# Patient Record
Sex: FEMALE | Race: WHITE | Employment: OTHER | URBAN - METROPOLITAN AREA
[De-identification: names, ages, dates, MRNs, and addresses within clinical notes are randomized per-mention and may not be internally consistent; named-entity substitution may affect disease eponyms.]

---

## 2017-10-30 ENCOUNTER — CLINICAL SUPPORT (OUTPATIENT)
Dept: PULMONOLOGY | Age: 62
End: 2017-10-30

## 2017-10-30 VITALS — WEIGHT: 105 LBS | BODY MASS INDEX: 20.62 KG/M2 | HEIGHT: 60 IN

## 2017-10-30 DIAGNOSIS — J44.9 CHRONIC OBSTRUCTIVE PULMONARY DISEASE, UNSPECIFIED COPD TYPE (HCC): ICD-10-CM

## 2017-10-30 DIAGNOSIS — J44.9 CHRONIC OBSTRUCTIVE PULMONARY DISEASE, UNSPECIFIED COPD TYPE (HCC): Primary | ICD-10-CM

## 2017-10-30 RX ORDER — DANAZOL 200 MG/1
200 CAPSULE ORAL 2 TIMES DAILY
COMMUNITY

## 2017-10-30 RX ORDER — BUDESONIDE AND FORMOTEROL FUMARATE DIHYDRATE 160; 4.5 UG/1; UG/1
2 AEROSOL RESPIRATORY (INHALATION) 2 TIMES DAILY
COMMUNITY

## 2017-10-30 RX ORDER — LANOLIN ALCOHOL/MO/W.PET/CERES
CREAM (GRAM) TOPICAL
COMMUNITY

## 2017-10-30 RX ORDER — PANTOPRAZOLE SODIUM 40 MG/1
40 TABLET, DELAYED RELEASE ORAL DAILY
COMMUNITY

## 2017-10-30 RX ORDER — ASCORBIC ACID 500 MG
TABLET ORAL
COMMUNITY

## 2017-10-30 RX ORDER — ACETAMINOPHEN 500 MG
TABLET ORAL
COMMUNITY

## 2017-10-30 NOTE — PROGRESS NOTES
Spirometry read - It shows very severe obstruction with no BD response.     Rubi Coelho MD/MPH  Pulmonary, Critical Care Medicine  [facility]  10/30/17  2:00 PM

## 2017-11-07 ENCOUNTER — OFFICE VISIT (OUTPATIENT)
Dept: PULMONOLOGY | Age: 62
End: 2017-11-07

## 2017-11-07 VITALS
BODY MASS INDEX: 20.62 KG/M2 | SYSTOLIC BLOOD PRESSURE: 100 MMHG | WEIGHT: 105 LBS | HEIGHT: 60 IN | RESPIRATION RATE: 16 BRPM | TEMPERATURE: 97.6 F | HEART RATE: 100 BPM | OXYGEN SATURATION: 95 % | DIASTOLIC BLOOD PRESSURE: 80 MMHG

## 2017-11-07 DIAGNOSIS — B37.0 THRUSH: ICD-10-CM

## 2017-11-07 DIAGNOSIS — I50.22 CHRONIC SYSTOLIC CONGESTIVE HEART FAILURE, NYHA CLASS 4 (HCC): ICD-10-CM

## 2017-11-07 DIAGNOSIS — R06.09 DYSPNEA ON EXERTION: ICD-10-CM

## 2017-11-07 DIAGNOSIS — J44.9 STAGE 4 VERY SEVERE COPD BY GOLD CLASSIFICATION (HCC): Primary | ICD-10-CM

## 2017-11-07 RX ORDER — ALBUTEROL SULFATE 90 UG/1
AEROSOL, METERED RESPIRATORY (INHALATION)
COMMUNITY

## 2017-11-07 RX ORDER — ALBUTEROL SULFATE 0.83 MG/ML
SOLUTION RESPIRATORY (INHALATION) ONCE
COMMUNITY

## 2017-11-07 RX ORDER — NYSTATIN 100000 [USP'U]/ML
1 SUSPENSION ORAL 4 TIMES DAILY
Qty: 60 ML | Refills: 1 | Status: SHIPPED | OUTPATIENT
Start: 2017-11-07

## 2017-11-07 NOTE — LETTER
11/7/2017 12:08 PM 
 
Patient:  Michele Alfred YOB: 1955 Date of Visit: 11/7/2017 Dear Sydnee Mancilla., MD 
[place] [address] VIA Facsimile: [phone] 
 : 
 
 
Thank you for referring Ms. Balbir Villagomez to me for evaluation/treatment. Below are the relevant portions of my assessment and plan of care. If you have questions, please do not hesitate to call me. I look forward to following Ms. Elliot Jaquez along with you. Sincerely, Stephanie Diaz MD

## 2017-11-07 NOTE — MR AVS SNAPSHOT
Visit Information Date & Time Provider Department Dept. Phone Encounter #  
 11/7/2017 10:30 AM Aleksandra Nelson MD [facility] [id number] Follow-up Instructions Return in about 3 months (around 2/7/2018). Upcoming Health Maintenance Date Due Hepatitis C Screening 1955 Pneumococcal 19-64 Medium Risk (1 of 1 - PPSV23) 3/13/1975 DTaP/Tdap/Td series (1 - Tdap) 3/13/1977 PAP AKA CERVICAL CYTOLOGY 3/13/1977 BREAST CANCER SCRN MAMMOGRAM 3/13/2006 FOBT Q 1 YEAR AGE 50-75 3/13/2006 ZOSTER VACCINE AGE 60> 1/13/2016 Influenza Age 5 to Adult 8/1/2017 Allergies as of 11/7/2017  Review Complete On: 10/30/2017 By: Fredrick Ya, RT Severity Noted Reaction Type Reactions Chlorhexidine Medium 10/30/2017   Side Effect Rash Pcn [Penicillins]  11/07/2017    Other (comments) Was told as a child Sulfa (Sulfonamide Antibiotics)  11/07/2017    Other (comments) Was told as a child Current Immunizations  Never Reviewed No immunizations on file. Not reviewed this visit You Were Diagnosed With   
  
 Codes Comments Stage 4 very severe COPD by GOLD classification ([facility] [address]    -  Primary ICD-10-CM: J44.9 ICD-9-CM: 735 Chronic systolic congestive heart failure, NYHA class 4 (HCC)     ICD-10-CM: I50.22 ICD-9-CM: 428.22, 428.0 Vitals BP Pulse Temp Resp Height(growth percentile) Weight(growth percentile) 100/80 (BP 1 Location: Right arm, BP Patient Position: Sitting) 100 97.6 °F (36.4 °C) 16 5' (1.524 m) 105 lb (47.6 kg) SpO2 BMI Smoking Status 95% 20.51 kg/m2 Former Smoker Vitals History BMI and BSA Data Body Mass Index Body Surface Area 20.51 kg/m 2 1.42 m 2 Preferred Pharmacy Pharmacy Name Phone [facility] [phone] [facility], [address] [place] [address], [address] [place] [address] Drive Your Updated Medication List  
 This list is accurate as of: 11/7/17 11:32 AM.  Always use your most recent med list.  
  
  
  
  
 * albuterol 90 mcg/actuation inhaler Commonly known as:  PROVENTIL HFA, VENTOLIN HFA, PROAIR HFA Take  by inhalation. * albuterol 2.5 mg /3 mL (0.083 %) nebulizer solution Commonly known as:  PROVENTIL VENTOLIN  
by Nebulization route once. danazol 200 mg capsule Commonly known as:  DANOCRINE Take 200 mg by mouth two (2) times a day. DOCUSATE SODIUM PO Take  by mouth. inhalational spacing device 1 Each by Does Not Apply route as needed. Iron 325 mg (65 mg iron) tablet Generic drug:  ferrous sulfate Take  by mouth Daily (before breakfast). nystatin 100,000 unit/mL suspension Commonly known as:  MYCOSTATIN Take 5 mL by mouth four (4) times daily. swish and spit PROTONIX 40 mg tablet Generic drug:  pantoprazole Take 40 mg by mouth daily. SPIRIVA WITH HANDIHALER 18 mcg inhalation capsule Generic drug:  tiotropium Take 1 Cap by inhalation daily. SYMBICORT 160-4.5 mcg/actuation Hfaa Generic drug:  budesonide-formoterol Take 2 Puffs by inhalation two (2) times a day. TYLENOL EXTRA STRENGTH 500 mg tablet Generic drug:  acetaminophen Take  by mouth every six (6) hours as needed for Pain. VITAMIN C 500 mg tablet Generic drug:  ascorbic acid (vitamin C) Take  by mouth.  
  
 warfarin Tab Commonly known as:  COUMADIN Take  by mouth. * Notice: This list has 2 medication(s) that are the same as other medications prescribed for you. Read the directions carefully, and ask your doctor or other care provider to review them with you. Prescriptions Sent to Pharmacy Refills  
 nystatin (MYCOSTATIN) 100,000 unit/mL suspension 1 Sig: Take 5 mL by mouth four (4) times daily. swish and spit  Class: Normal  
 Pharmacy: [address] AT [address] [place] [address] #: [phone] Route: Oral  
 inhalational spacing device 1 Si Each by Does Not Apply route as needed. Class: Normal  
 Pharmacy: [facility] [address] [place] [address], [address] [place] [address] Ph #: [phone] Route: Does Not Apply Follow-up Instructions Return in about 3 months (around 2018). Patient Instructions Learning About Metered-Dose Inhalers for Children What is a metered-dose inhaler? A metered-dose inhaler lets your child breathe medicine directly into the lungs. Inhaled medicine works faster than the same medicine in a pill. An inhaler lets your child take less medicine than he or she would if it were taken as a pill. \"Metered-dose\" means that the inhaler gives a measured amount of medicine each time your child uses it. This type of inhaler delivers medicine in the form of a liquid mist. 
The doctor may want your child to use a spacer with the inhaler. A spacer is a chamber that attaches to the inhaler. The chamber holds the medicine before your child inhales it. That way, your child can inhale the medicine in as many breaths as he or she needs. Doctors recommend using a spacer with most metered-dose inhalers, especially those with corticosteroid medicines. A regular spacer has a mouthpiece. Some younger children have a hard time using it. They may need a face mask with a spacer instead. Your child can use the face mask instead of the mouthpiece. The mask fits over the child's mouth and nose. How does your child use a metered-dose inhaler? To get started · Ask the doctor, nurse, respiratory therapist, or pharmacist to watch your child use the inhaler the first time. It might help if your child practices using it in front of a mirror. Be sure your child uses the inhaler exactly as prescribed. · Check that your child has the correct medicine.  If your child uses several inhalers, put a label on each one so that he or she knows which one to use at the right time. · Keep track of how much medicine is in the inhaler. Check the label to see how many doses are in it. If you and your child know how many puffs to take, you can replace the inhaler before it runs out. Your doctor or pharmacist can teach you and your child how to keep track of how much medicine is left. · If your child is using corticosteroids, have your child gargle and rinse the mouth with water after use. Or have your child brush his or her teeth and spit after using the inhaler. Do not let your child swallow the water. Swallowing the water will increase the chance that the medicine will get into the bloodstream. This may make it more likely that your child will have side effects from the medicine. To use a spacer with an inhaler 1. Have your child shake the inhaler. Remove the inhaler cap, and place the mouthpiece of the inhaler into the spacer. Check the inhaler instructions to see if you need to prime the inhaler before you use it. If it needs priming, follow the instructions on how to prime it. 2. Remove the cap from the spacer. 3. The inhaler should be upright with the mouthpiece at the bottom. 4. Have your child tilt his or her head back a little and breathe out slowly and completely. 5. Place the spacer's mouthpiece in your child's mouth. 6. Have your child press down on the inhaler to spray one puff of medicine into the spacer. Then have your child take one deep, slow breath. Have your child hold his or her breath for 10 seconds. This will let the medicine settle in the lungs. Some spacers have a whistle. If you hear it, have your child breathe in more slowly. 7. If your child needs to take a second dose, wait 30 to 60 seconds. This lets the inhaler valve refill. To use an inhaler without a spacer 1. Have your child shake the inhaler as directed. Remove the cap.  Check the inhaler instructions to see if you need to prime your child's inhaler before you use it. If it needs priming, follow the instructions on how to prime it. 2. The inhaler should be upright with the mouthpiece at the bottom. 3. Have your child tilt his or her head back a little and breathe out slowly and completely. 4. The inhaler can be positioned in one of two ways: ¨ The inhaler mouthpiece can be in your child's mouth. This method is easier for most children. It also lowers the risk that any of the medicine will get into the eyes. ¨ Or the mouthpiece can be held 1 to 2 inches in front of your child's open mouth. With this method, the lips should not be closed over the mouthpiece. Instead, your child's mouth should be open as wide as possible. This method, with the mouthpiece in front of your child's open mouth, may be better for getting the medicine into the lungs. But some children may find it too hard to do. 5. Your child can start taking slow, even breaths through the mouth. Press down on the inhaler one time. Then have your child inhale fully. 6. Have your child hold his or her breath for 10 seconds. This will let the medicine settle in the lungs. If your child needs to take a second dose, wait 30 to 60 seconds to let the inhaler valve refill. Follow-up care is a key part of your child's treatment and safety. Be sure to make and go to all appointments, and call your doctor if your child is having problems. It's also a good idea to know your child's test results and keep a list of the medicines your child takes. Where can you learn more? Go to http://coral-philip.info/. Enter D341 in the search box to learn more about \"Learning About Metered-Dose Inhalers for Children. \" Current as of: May 12, 2017 Content Version: 11.4 © 8853-2495 Healthwise, Incorporated.  Care instructions adapted under license by [employer] (which disclaims liability or warranty for this information). If you have questions about a medical condition or this instruction, always ask your healthcare professional. [address] any warranty or liability for your use of this information. Introducing \[facility]\"" SERVICES! Samantha Hoover introduces [employer] patient portal. Now you can access parts of your medical record, email your doctor's office, and request medication refills online. 1. In your internet browser, go to [email or web address] 2. Click on the First Time User? Click Here link in the Sign In box. You will see the New Member Sign Up page. 3. Enter your [employer] Access Code exactly as it appears below. You will not need to use this code after youve completed the sign-up process. If you do not sign up before the expiration date, you must request a new code. · [employer] Access Code: [email or web address] Expires: 1/28/2018 10:02 AM 
 
4. Enter the last four digits of your Social Security Number (xxxx) and Date of Birth (mm/dd/yyyy) as indicated and click Submit. You will be taken to the next sign-up page. 5. Create a [employer] ID. This will be your [employer] login ID and cannot be changed, so think of one that is secure and easy to remember. 6. Create a [employer] password. You can change your password at any time. 7. Enter your Password Reset Question and Answer. This can be used at a later time if you forget your password. 8. Enter your e-mail address. You will receive e-mail notification when new information is available in [address]. 9. Click Sign Up. You can now view and download portions of your medical record. 10. Click the Download Summary menu link to download a portable copy of your medical information. If you have questions, please visit the Frequently Asked Questions section of the [employer] website. Remember, [employer] is NOT to be used for urgent needs. For medical emergencies, dial 911. Now available from your iPhone and Android! Please provide this summary of care documentation to your next provider. Your primary care clinician is listed as Starling Rummage. If you have any questions after today's visit, please call [phone].

## 2017-11-07 NOTE — PATIENT INSTRUCTIONS
Learning About Metered-Dose Inhalers for Children  What is a metered-dose inhaler? A metered-dose inhaler lets your child breathe medicine directly into the lungs. Inhaled medicine works faster than the same medicine in a pill. An inhaler lets your child take less medicine than he or she would if it were taken as a pill. \"Metered-dose\" means that the inhaler gives a measured amount of medicine each time your child uses it. This type of inhaler delivers medicine in the form of a liquid mist.  The doctor may want your child to use a spacer with the inhaler. A spacer is a chamber that attaches to the inhaler. The chamber holds the medicine before your child inhales it. That way, your child can inhale the medicine in as many breaths as he or she needs. Doctors recommend using a spacer with most metered-dose inhalers, especially those with corticosteroid medicines. A regular spacer has a mouthpiece. Some younger children have a hard time using it. They may need a face mask with a spacer instead. Your child can use the face mask instead of the mouthpiece. The mask fits over the child's mouth and nose. How does your child use a metered-dose inhaler? To get started  · Ask the doctor, nurse, respiratory therapist, or pharmacist to watch your child use the inhaler the first time. It might help if your child practices using it in front of a mirror. Be sure your child uses the inhaler exactly as prescribed. · Check that your child has the correct medicine. If your child uses several inhalers, put a label on each one so that he or she knows which one to use at the right time. · Keep track of how much medicine is in the inhaler. Check the label to see how many doses are in it. If you and your child know how many puffs to take, you can replace the inhaler before it runs out. Your doctor or pharmacist can teach you and your child how to keep track of how much medicine is left.   · If your child is using corticosteroids, have your child gargle and rinse the mouth with water after use. Or have your child brush his or her teeth and spit after using the inhaler. Do not let your child swallow the water. Swallowing the water will increase the chance that the medicine will get into the bloodstream. This may make it more likely that your child will have side effects from the medicine. To use a spacer with an inhaler  1. Have your child shake the inhaler. Remove the inhaler cap, and place the mouthpiece of the inhaler into the spacer. Check the inhaler instructions to see if you need to prime the inhaler before you use it. If it needs priming, follow the instructions on how to prime it. 2. Remove the cap from the spacer. 3. The inhaler should be upright with the mouthpiece at the bottom. 4. Have your child tilt his or her head back a little and breathe out slowly and completely. 5. Place the spacer's mouthpiece in your child's mouth. 6. Have your child press down on the inhaler to spray one puff of medicine into the spacer. Then have your child take one deep, slow breath. Have your child hold his or her breath for 10 seconds. This will let the medicine settle in the lungs. Some spacers have a whistle. If you hear it, have your child breathe in more slowly. 7. If your child needs to take a second dose, wait 30 to 60 seconds. This lets the inhaler valve refill. To use an inhaler without a spacer  1. Have your child shake the inhaler as directed. Remove the cap. Check the inhaler instructions to see if you need to prime your child's inhaler before you use it. If it needs priming, follow the instructions on how to prime it. 2. The inhaler should be upright with the mouthpiece at the bottom. 3. Have your child tilt his or her head back a little and breathe out slowly and completely. 4. The inhaler can be positioned in one of two ways:  ¨ The inhaler mouthpiece can be in your child's mouth.  This method is easier for most children. It also lowers the risk that any of the medicine will get into the eyes. ¨ Or the mouthpiece can be held 1 to 2 inches in front of your child's open mouth. With this method, the lips should not be closed over the mouthpiece. Instead, your child's mouth should be open as wide as possible. This method, with the mouthpiece in front of your child's open mouth, may be better for getting the medicine into the lungs. But some children may find it too hard to do. 5. Your child can start taking slow, even breaths through the mouth. Press down on the inhaler one time. Then have your child inhale fully. 6. Have your child hold his or her breath for 10 seconds. This will let the medicine settle in the lungs. If your child needs to take a second dose, wait 30 to 60 seconds to let the inhaler valve refill. Follow-up care is a key part of your child's treatment and safety. Be sure to make and go to all appointments, and call your doctor if your child is having problems. It's also a good idea to know your child's test results and keep a list of the medicines your child takes. Where can you learn more? Go to http://coral-philip.info/. Enter D341 in the search box to learn more about \"Learning About Metered-Dose Inhalers for Children. \"  Current as of: May 12, 2017  Content Version: 11.4  © 9293-6078 Healthwise, Incorporated. Care instructions adapted under license by [employer] (which disclaims liability or warranty for this information). If you have questions about a medical condition or this instruction, always ask your healthcare professional. [place] [address] any warranty or liability for your use of this information.

## 2017-11-07 NOTE — PROGRESS NOTES
[facility]  Pulmonary, Critical Care, and Sleep Medicine      Pulmonary Office Initial Consultation    Name: Obi Charles     : 1955     Date: 2017        Referring provider: Isrrael Ortiz NP  CC: COPD management    Subjective:     Patient is a 65 y.o. female with hx of COPD, CHF s/p LVAD placement, a-fib, on chronic anticoagulation with warfarin, presents to Pulmonary clinic referred for management of her COPD. Pt reports being diagnosed with COPD in . Does not get frequent bronchitis. Was on PRN albuterol inhaler and nebulizer during this time. Pt on Spiriva and Symbicort (without spacer) since . She reports getting thrush with oral blisters a few times since starting Symbicort, most recent was one week ago. She uses her prn albuterol very infrequently, maybe a few times a week. Pt quit smoking in 2017, prior 2PPD for 45 years. 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 Pt was trach and PEG'ed in 17 for respiratory failure, now decannulated on 10/17/17, pt was on mech vent for about 2-3 weeks per daughter present. Pt reports she is doing well. Currently still dyspneic with minimal exertion, she reports she may get SOB when rolling in bed. She notes her strength has improved in the last two weeks.     Associated symptoms:  + wt loss 30lbs in 6 months but now gaining wt  Mild chronic cough, only rarely productive of whitish sputum  Denies chest pain, fevers, chills, N/V/D, hemoptysis, night sweats, wheezing, frequent use of PRN albuterol inhaler    Pets: none  No illicit drug use      Past Medical History:   Diagnosis Date    Chronic lung disease     Congestive heart failure ([facility] [address]     Coronary artery disease     3 open heart surg.  Pneumonia        PSHx:  Reviewed, pt has had LVAD in Sept 2017 - underwent three procedures that month      Social History     Social History    Marital status:      Spouse name: N/A    Number of children: N/A    Years of education: N/A     Social History Main Topics    Smoking status: Former Smoker     Packs/day: 1.00     Years: 43.00     Types: Cigarettes     Quit date: 7/30/2017    Smokeless tobacco: Never Used    Alcohol use Not on file    Drug use: Not on file    Sexual activity: Not on file     Other Topics Concern    Not on file     Social History Narrative   As noted above with occ hx    Family History   Problem Relation Age of Onset    Diabetes Mother     Hypertension Mother     Heart Disease Mother     Heart Disease Father        Allergies   Allergen Reactions    Chlorhexidine Rash    Pcn [Penicillins] Other (comments)     Was told as a child    Sulfa (Sulfonamide Antibiotics) Other (comments)     Was told as a child       . Current Outpatient Prescriptions   Medication Sig Dispense Refill    albuterol (PROVENTIL HFA, VENTOLIN HFA, PROAIR HFA) 90 mcg/actuation inhaler Take  by inhalation.  albuterol (PROVENTIL VENTOLIN) 2.5 mg /3 mL (0.083 %) nebulizer solution by Nebulization route once.  ferrous sulfate (IRON) 325 mg (65 mg iron) tablet Take  by mouth Daily (before breakfast).  ascorbic acid, vitamin C, (VITAMIN C) 500 mg tablet Take  by mouth.  acetaminophen (TYLENOL EXTRA STRENGTH) 500 mg tablet Take  by mouth every six (6) hours as needed for Pain.  DOCUSATE SODIUM PO Take  by mouth.  danazol (DANOCRINE) 200 mg capsule Take 200 mg by mouth two (2) times a day.  warfarin (COUMADIN) tab Take  by mouth.  tiotropium (SPIRIVA WITH HANDIHALER) 18 mcg inhalation capsule Take 1 Cap by inhalation daily.       budesonide-formoterol (SYMBICORT) 160-4.5 mcg/actuation HFAA Take 2 Puffs by inhalation two (2) times a day.  pantoprazole (PROTONIX) 40 mg tablet Take 40 mg by mouth daily. Review of Systems:  A complete review of systems was performed as stated above in HPI, all others are negative    Objective:     Visit Vitals    /80 (BP 1 Location: Right arm, BP Patient Position: Sitting)  Comment: very hard to hear due to pump    Pulse 100    Temp 97.6 °F (36.4 °C)    Resp 16    Ht 5' (1.524 m)    Wt 47.6 kg (105 lb)  Comment: yesterday home health    SpO2 95%    BMI 20.51 kg/m2        Physical Exam:   Vitals reviewed  GENERAL: Pt sitting in wheelchair without acute distress, pleasant, cooperative, wearing LVAD controller around neck  HEENT:  NC/AT, PERRL, EOMI, no alar flaring or epistaxis, oral mucosa moist without cyanosis  NECK:  no jugular vein distention, no retractions, no thyromegaly or masses, tracheostomy site healed over, no lesions  LUNGS:  Distant breath sounds B/L, no wheezes/rales/rhonchi  HEART:  Mechanical pump sounds heard throughout precordium, regular rate/rhythm with mechanical oscillations, unable to hear M/R/G or S1/S2 sounds  ABDOMEN:  soft with no tenderness, bowel sounds present  EXTREMITIES:  warm with no cyanosis or edema  SKIN:  no jaundice or ecchymosis  NEUROLOGIC:  alert and oriented, grossly non-focal, normal strength and coordination while sitting in chair  PSYCH: normal memory, thought content and memory    Data review:     Labs:  No new labs present, none provided in referral.      PFT:  I have reviewed these results from 10/30/17 -- shows very severe obstruction  Ratio 41  FEV1 25%/29%  FVC 48%/55%    Imaging: No new images. Records from [facility] requested          Assessment and Plan  66 y/o female with    Impression:  1. Dyspnea with Gold Stage IV COPD, risk category B, RAZIA index 10 based on mMRC of 4 (this may be largely driven by heart failure):  Pt currently stable with no evidence of exacerbation  2. Thrush:  Etiology due to ICS and poor inhaler technique  3. CHF s/p LVAD placement, NHYA IV - document as nonischemic cardiomyopathy:  Pt managed at [facility]  4. Hx of tobacco use:  Pt was prior 2PPD smoker, quit in July 2017. Pt currently using e-cigarette  5. Chronic respiratory failure:  Pt was on mech vent s/p trach in 9/25 which was decannulated 10/17/17  6. Hx of GI bleed due to multiple AVMs  7. A-fib    Plan:  -Asked to obtain records from [address] [place] [address] including chest imaging (XR and CT scans). Counseled patient regarding annual lung cancer screening with low dose CT, including risks and benefits. We will see if patient had dedicated CT scan while at [address] [place] [address] and readdress at followup.  -Pt unable to perform 6MWT to see if she needs supplemental oxygen with ambulation. Since patient is recovering from recent multiple surgeries, we will hold off on nocturnal pulse oximetry but will consider this at a future visit.  -Continue Spiriva qd, Symbicort (160/4.5mcg 2puffs BID), and albuterol PRN. Prescribed spacer chamber for Symbicort and had staff show patient proper inhaler technique. Advised patient that if she has any questions to come back to our office with equipment so we can demonstrate it. Advised to rinse mouth thoroughly and wash spacer and dry it completely after each use. -Nystatin oral x 1 week for thrush  -Management of CHF per [facility]  -Advised patient to followup with [facility] pulmonary regarding possible transplant as patient may likely need heart and lung transplant because heart transplant alone would be high risk. Advised patient to discuss with [facility] pulmonary and cardiology. I instructed her that we would be glad to perform any testing locally needed to help facilitate her workup.  -Advised patient to followup with cardiopulmonary rehab as prescribed by [address] [place] [address] Cardiology, which may help with her symptoms of dyspnea.   -Counseled patient regarding remaining abstinent from tobacco use including adverse health risks and worsening of COPD. Advised patient that she may contact our office for assistance if she desires. I also counseled her against use of e-cigarettes and advised her on cessation of this as well including potential health risks. -Pneumovax: 2015  -Flu shot: pt declined.   Risks and benefits were explained      RTC 3 months    Orders Placed This Encounter    albuterol (PROVENTIL HFA, VENTOLIN HFA, PROAIR HFA) 90 mcg/actuation inhaler    albuterol (PROVENTIL VENTOLIN) 2.5 mg /3 mL (0.083 %) nebulizer solution    nystatin (MYCOSTATIN) 100,000 unit/mL suspension    inhalational spacing device       Vanessa Esparza MD/MPH  Pulmonary, Critical Care Medicine  [facility]  11/07/17  10:27 AM

## 2017-11-07 NOTE — PROGRESS NOTES
Consent for mailing of disk and faxing of all imaging reports faxed to [facility] radiology.

## 2017-11-16 ENCOUNTER — TELEPHONE (OUTPATIENT)
Dept: PULMONOLOGY | Age: 62
End: 2017-11-16

## 2017-11-16 NOTE — TELEPHONE ENCOUNTER
Pt has some questions about her pfts from what Dr. Lynette Bruner told her and what her dr at [facility] is telling her. She is confused about the lung function and how much it increased with medication. Please call [phone].

## 2017-11-21 ENCOUNTER — DOCUMENTATION ONLY (OUTPATIENT)
Dept: PULMONOLOGY | Age: 62
End: 2017-11-21

## 2017-11-21 NOTE — PROGRESS NOTES
Received and reviewed patient records from [facility].  80 pages of imaging reports reviewed and scanned into chart. Pt's last CXR (page 9 of 80) from 10/19/17 noted stable cardiomegaly, prior pulmonary edema resolved, vascular pattern within normal limits. Noted to have ongoing LLL collapse/consolidation and at least a small left pleural effusion, unchanged. Noted to not have right pleural effusion, pulm opacitiy, or discrete PTX. Pt's last CT chest (page 66 of 80) was from 7/10/17 noted marked cardiomegaly with LV enlargement. Lungs had centrilobular emphysema, upper lobe predominant, diffuse bronchial wall thickening, with focal opacity in lingula (image 159 ser 3), likely representing scarring. Additional mild scarring in RML (image 238 series3), mild linear scarring in LLL, mild bibasilar atelectasis (img 248 ser 3), no effusions, no ptx. In impression, radiologist recommended repeat low-dose CT in 3-6 months. Will discuss with patient at next visit and order CT chest at that time.     Andreas Bernard MD/MPH  Pulmonary, Critical Care Medicine  [facility]  11/21/17  2:18 PM